# Patient Record
Sex: MALE | Race: BLACK OR AFRICAN AMERICAN | Employment: FULL TIME | URBAN - METROPOLITAN AREA
[De-identification: names, ages, dates, MRNs, and addresses within clinical notes are randomized per-mention and may not be internally consistent; named-entity substitution may affect disease eponyms.]

---

## 2017-12-13 ENCOUNTER — OFFICE VISIT (OUTPATIENT)
Dept: FAMILY MEDICINE | Facility: CLINIC | Age: 37
End: 2017-12-13
Payer: COMMERCIAL

## 2017-12-13 VITALS
SYSTOLIC BLOOD PRESSURE: 124 MMHG | BODY MASS INDEX: 24.03 KG/M2 | HEART RATE: 73 BPM | WEIGHT: 177.4 LBS | TEMPERATURE: 98.2 F | OXYGEN SATURATION: 99 % | DIASTOLIC BLOOD PRESSURE: 81 MMHG | HEIGHT: 72 IN

## 2017-12-13 DIAGNOSIS — Z01.818 PREOP GENERAL PHYSICAL EXAM: Primary | ICD-10-CM

## 2017-12-13 RX ORDER — FLUTICASONE PROPIONATE 50 MCG
SPRAY, SUSPENSION (ML) NASAL
COMMUNITY
End: 2018-02-16

## 2017-12-13 RX ORDER — MULTIVITAMIN WITH IRON
TABLET ORAL
COMMUNITY

## 2017-12-13 NOTE — MR AVS SNAPSHOT
After Visit Summary   12/13/2017    Mahdi BRIA Cesar    MRN: [id number]           Patient Information     Date Of Birth          1980        Visit Information        Provider Department      12/13/2017 4:10 PM Jason Schreiber,  [facility]        Today's Diagnoses     Preop general physical exam    -  1      Care Instructions      Presurgery Checklist  You are scheduled to have surgery. The healthcare staff will try to make your stay comfortable. Use the guidelines below to remind yourself what to do before surgery. Be sure to follow any specific pre-op instructions from your surgeon or nurse.   Preparing for Surgery  Ask your surgeon if you ll need a blood transfusion during surgery and if so, how to prepare for it. In some cases, you can donate blood before surgery. If needed, this blood can be given back (transfused) to you during or after surgery.  If you are having abdominal surgery, ask what you need to do to clear your bowel.  Tell your surgeon if you have allergies to any medications or foods.  Arrange for an adult family member or friend to drive you home after surgery. If possible, have someone ready to help you at home as you recover.  Call the surgeon if you get a cold, fever, sore throat, diarrhea, or other health problem just before surgery. Your surgeon can decide whether or not to postpone the surgery.  Medications  Tell your surgeon about all medications you take, including prescription and over-the-counter products such as herbal remedies and vitamins. Ask if you should continue taking them.  If you take ibuprofen, naproxen, or  blood thinners  such as aspirin, clopidogrel (Plavix), or warfarin (Coumadin), ask your surgeon whether you should stop taking them and how long before surgery you should stop.  You may be told to take antibiotics just before surgery to prevent infection. If so, follow instructions carefully on how to take them.  If you are told to take medications  called anticoagulants to prevent blood clots after surgery, be sure to follow the instructions on how to take them.  Stop Smoking  If you smoke, healing may take longer. So at least 2 week(s) before surgery, stop smoking.  Bathing or Showering Before Surgery  If instructed, wash with antibacterial soap. Afterward, do not use lotions or powders.  If you are having surgery on the head, you may be asked to shampoo with antibacterial soap. Follow instructions for doing so.  Do Not Remove Hair from the Surgery Site  Do not shave hair from the incision site, unless you are given specific instructions to do so. Usually, if hair needs to be removed, it will be done at the hospital right before surgery.  Don t Eat or Drink  Your doctor will tell you when to stop eating and drinking. If you do not follow your doctor's instructions, your procedure may be postponed or rescheduled for another day.  If your surgeon tells you to continue any medications, take them with small sips of water.  You can brush your teeth and rinse your mouth, but don t swallow any water.  Day of Surgery  Do not wear makeup. Do not use perfume, deodorant, or hairspray. Remove nail polish and artificial nails.  Leave jewelry (including rings), watches, and other valuables at home.  Be sure to bring health insurance cards or forms and a photo ID.  Bring a list of your medications (include the name, dose, how often you take them, and the time last dose was taken).  Arrive on time at the hospital or surgery facility.          Follow-ups after your visit        Who to contact     Please call your clinic at [phone] to:    Ask questions about your health    Make or cancel appointments    Discuss your medicines    Learn about your test results    Speak to your doctor   If you have compliments or concerns about an experience at your clinic, or if you wish to file a complaint, please contact [facility] Patient Relations at [phone]  "or email us at [email or web address]         Additional Information About Your Visit        [employer] Information     Good[employer]! is an electronic gateway that provides easy, online access to your medical records. With GoodChime!, you can request a clinic appointment, read your test results, renew a prescription or communicate with your care team.     To sign up for GoodChime! visit the website at [email or web address]   You will be asked to enter the access code listed below, as well as some personal information. Please follow the directions to create your username and password.     Your access code is: [email or web address]  Expires: 3/13/2018  5:20 PM     Your access code will  in 90 days. If you need help or a new code, please contact your [facility] or call [phone] for assistance.        Care EveryWhere ID     This is your Care EveryWhere ID. This could be used by other organizations to access your [place] medical records  [id number]        Your Vitals Were     Pulse Temperature Height Pulse Oximetry BMI (Body Mass Index)       73 98.2  F (36.8  C) 5' 11.65\" (182 cm) 99% 24.29 kg/m2        Blood Pressure from Last 3 Encounters:   17 124/81   10/18/15 127/67   12 139/104    Weight from Last 3 Encounters:   17 177 lb 6.4 oz (80.5 kg)   10/18/15 185 lb (83.9 kg)   12 186 lb (84.4 kg)              Today, you had the following     No orders found for display       Primary Care Provider Office Phone # Fax #    Nabila Núñez [phone]       [address] [address]        Equal Access to Services     JACKSON TORRES : Zonia Lovelace, nancy miguel, dimple kaalmaedgar xie. So [facility] [phone].    ATENCIÓN: Si habla español, tiene a gray disposición servicios gratuitos de asistencia lingüística. Llame al [phone].    We comply with " applicable federal civil rights laws and [place] laws. We do not discriminate on the basis of race, color, national origin, age, disability, sex, sexual orientation, or gender identity.            Thank you!     Thank you for choosing [facility]  for your care. Our goal is always to provide you with excellent care. Hearing back from our patients is one way we can continue to improve our services. Please take a few minutes to complete the written survey that you may receive in the mail after your visit with us. Thank you!             Your Updated Medication List - Protect others around you: Learn how to safely use, store and throw away your medicines at www.disposemymeds.org.          This list is accurate as of: 12/13/17  5:20 PM.  Always use your most recent med list.                   Brand Name Dispense Instructions for use Diagnosis    ALBUTEROL SULFATE HFA IN           FISH OIL CONCENTRATE 300 MG Caps           fluticasone 50 MCG/ACT spray    FLONASE          ibuprofen 600 MG tablet    ADVIL/MOTRIN    20 tablet    Take 1 tablet (600 mg) by mouth every 8 hours as needed for pain        PSEUDOEPHEDRINE HCL ER PO

## 2017-12-13 NOTE — PROGRESS NOTES
Preceptor attestation:  Patient seen and discussed with the resident. Assessment and plan reviewed with resident and agreed upon.  Supervising physician: Rocco Feldman  [facility]

## 2017-12-13 NOTE — PROGRESS NOTES
[address] [address]  Phone: [phone]  Fax: [phone]    12/13/2017    Adult PRE-OP Evaluation:    Mahdi BRIA Cesar, 1980 presents for pre-operative evaluation and assessment as requested by Dr. Williamson, prior to undergoing surgery/procedure for treatment of  Chronic sinusitis .    Proposed procedure: Septoplasty/sinus surgery    Date of Surgery/ Procedure: 12/14/17  Hospital/Surgical Facility: Day surgery center [facility]     Primary Physician: Nabila Núñez  Type of Anesthesia Anticipated: General  History of anesthesia complications: NONE  History of  abnormal bleeding: NONE   History of blood transfusions: NO  Patient has a Health Care Directive or Living Will:  NO    Preoperative Questions   1. NO - Do you have a history of heart attack, stroke, stent, bypass or surgery on an artery in the head, neck, heart or legs?  2. YES - Do you ever have any pain or discomfort in your chest? Asthma  3. NO - Have you ever had a severe pain across the front of your chest lasting for half an hour or more?  4. NO - Do you have a history of Congestive Heart Failure?  5. NO - Are you troubled by shortness of breath when: walking on the level/ up a slight hill/ at night?  6. YES - Does your chest ever sound wheezy or whistling? Asthma  7. NO - Do you currently have a cold, bronchitis or other respiratory infection?  8. YES - Have you had a cold, bronchitis or other respiratory infection within the last 2 weeks? Sinus infections  9. NO - Do you usually have a cough?  10. NO - Do you sometimes get pains in the calves of your legs when you walk?  11. NO - Do you or anyone in your family have previous history of blood clots?  12. NO - Do you or does anyone in your family have a serious bleeding problem such as prolonged bleeding following surgeries or cuts?  13. NO - Have you ever had problems with anemia or been told to take iron pills?  14. NO - Have you had any abnormal blood loss such as  black, tarry or bloody stools, or abnormal vaginal bleeding?  15. NO - Have you ever had a blood transfusion?  16. NO - Have you or any of your relatives ever had problems with anesthesia?  17. YES - Do you have sleep apnea, excessive snoring or daytime drowsiness? But does not use CPAP  18. NO - Do you have any prosthetic heart valves?  19. NO - Do you have prosthetic joints?  20. NO - Is there any chance that you may be pregnant?    PMH:   Past Medical History:   Diagnosis Date     Chronic sinusitis      Latent tuberculosis 2001    completed 9 months INH     Uncomplicated asthma          PSH:   Past Surgical History:   Procedure Laterality Date     SHOULDER SURGERY  2007     TONSILLECTOMY       SH:   Social History     Social History     Marital status:      Spouse name: N/A     Number of children: N/A     Years of education: N/A     Occupational History     Not on file.     Social History Main Topics     Smoking status: Former Smoker     Quit date: 2001     Smokeless tobacco: Never Used     Alcohol use No     Drug use: No     Sexual activity: Yes     Partners: Female     Other Topics Concern     Not on file     Social History Narrative     FH:       Family History   Problem Relation Age of Onset     Dementia Mother      CANCER No family hx of          Current Outpatient Prescriptions on File Prior to Visit:  ibuprofen (ADVIL,MOTRIN) 600 MG tablet Take 1 tablet (600 mg) by mouth every 8 hours as needed for pain     No current facility-administered medications on file prior to visit.     OTC products: benadryl and ibuprofen    Allergies   Allergen Reactions     Pollen Extract Itching     Dogs,cats     Latex Allergy: NO    Social History     Social History     Marital status:      Spouse name: N/A     Number of children: N/A     Years of education: N/A     Social History Main Topics     Smoking status: Not on file     Smokeless tobacco: Not on file     Alcohol use Not on file     Drug use: Not on file  "    Sexual activity: Not on file     Other Topics Concern     Not on file     Social History Narrative     No narrative on file       REVIEW OF SYSTEMS:   Positive sinus pain.   Constitutional, cardiovascular, pulmonary, GI, , musculoskeletal, neuro, skin, endocrine and psych systems are negative, except as otherwise noted.      EXAM:     Patient Vitals for the past 24 hrs:   BP Temp Pulse SpO2 Height Weight   12/13/17 1629 124/81 98.2  F (36.8  C) 73 99 % 5' 11.65\" (182 cm) 177 lb 6.4 oz (80.5 kg)     Body mass index is 24.29 kg/(m^2).  GENERAL: healthy, alert and no distress  EYES: Eyes grossly normal to inspection, extraocular movements - intact, and PERRL  HENT: ear canals- normal; TMs- normal; Nose- normal; Mouth- no ulcers, no lesions  NECK: no tenderness, no adenopathy, no asymmetry, no masses, no stiffness; thyroid- normal to palpation  RESP: lungs clear to auscultation - no rales, no rhonchi, no wheezes  CV: regular rates and rhythm, normal S1 S2, no S3 or S4, no click or rub -2/6 holosystolic murmur previously noted  ABDOMEN: soft, no tenderness, no  hepatosplenomegaly, no masses, normal bowel sounds  MS: extremities- no gross deformities noted, no edema  SKIN: no suspicious lesions, no rashes  NEURO: strength and tone- normal, sensory exam- grossly normal, mentation- intact, speech- normal, reflexes- symmetric  BACK: no CVA tenderness, no paralumbar tenderness  PSYCH: Alert and oriented times 3; speech- coherent , normal rate and volume; able to articulate logical thoughts  LYMPHATICS: ant. cervical- normal, post. cervical- normal    DIAGNOSTICS:      No labs or EKG required    RISK ASSESSMENT:     Cardiovascular Risk:  -Patient is able to participate in strenuous activities without chest pain.  -The patient does not have chest pain with exertion.  -Patient does not have a history of congestive heart failure.    -The patient does not have a history of stroke and does not have a history of valvular " disease.  -Echocardiogram from 2010 reviewed.    Pulmonary Risk:  -In terms of risk factors for pulmonary complication, the patient has asthma    Perioperative Complications:  -The patient does not have a history of bleeding or clotting problems in the past.    -The patient has not had complications from surgeries.    -The patient does not have a family history of any anesthesia or surgical complications.      IMPRESSION:   Reason for surgery/procedure: Chronic sinusitis    The proposed surgical procedure is considered INTERMEDIATE risk.    For above listed surgery and anesthesia:   Patient is at low risk for surgery/procedure and perioperative/procedure complications.    RECOMMENDATIONS:     Labs:  None needed.  Echocardiogram from 2010 reviewed.    Fasting:  NPO for 12 hours prior to surgery    Preop Plan:  --Approval given to proceed with proposed procedure, without further diagnostic evaluation    Medications:  Patient should hold their regular medications the morning of surgery unless otherwise instructed.    Hold ibuprofen for 5 days prior.      Jason Schreiber, DO    Please contact our office if there are any further questions or information required about this patient.

## 2018-02-16 ENCOUNTER — OFFICE VISIT (OUTPATIENT)
Dept: FAMILY MEDICINE | Facility: CLINIC | Age: 38
End: 2018-02-16
Payer: COMMERCIAL

## 2018-02-16 VITALS
OXYGEN SATURATION: 98 % | SYSTOLIC BLOOD PRESSURE: 144 MMHG | WEIGHT: 180.8 LBS | TEMPERATURE: 97.9 F | BODY MASS INDEX: 24.49 KG/M2 | HEIGHT: 72 IN | HEART RATE: 84 BPM | DIASTOLIC BLOOD PRESSURE: 84 MMHG

## 2018-02-16 DIAGNOSIS — K21.9 GASTROESOPHAGEAL REFLUX DISEASE, ESOPHAGITIS PRESENCE NOT SPECIFIED: Primary | ICD-10-CM

## 2018-02-16 DIAGNOSIS — J32.9 RHINOSINUSITIS: ICD-10-CM

## 2018-02-16 RX ORDER — FLUTICASONE PROPIONATE 50 MCG
1 SPRAY, SUSPENSION (ML) NASAL DAILY
Qty: 1 BOTTLE | Refills: 1 | Status: SHIPPED | OUTPATIENT
Start: 2018-02-16

## 2018-02-16 RX ORDER — ALPHA-D-GALACTOSIDASE 150 UNIT
2 TABLET ORAL DAILY PRN
Qty: 84 TABLET | Refills: 1 | Status: SHIPPED | OUTPATIENT
Start: 2018-02-16

## 2018-02-16 RX ORDER — FAMOTIDINE 20 MG/1
20 TABLET, FILM COATED ORAL 2 TIMES DAILY
Qty: 60 TABLET | Refills: 1 | Status: SHIPPED | OUTPATIENT
Start: 2018-02-16 | End: 2018-05-10

## 2018-02-16 NOTE — PATIENT INSTRUCTIONS
- Take the Beano ONLY if you have gas with meals  - Take the famotidine 2 x a day with meals  - Take the flonase daily  - If continued pain, no relief in 2-3 weeks, make appointment with me, if pain worsens, fevers, chills, vomiting or diarrhea or feel unwell, see me sooner

## 2018-02-16 NOTE — PROGRESS NOTES
Preceptor attestation:  Patient seen and discussed with the resident. Assessment and plan reviewed with resident and agreed upon.  Supervising physician: Stan Sierra  [facility]

## 2018-02-16 NOTE — MR AVS SNAPSHOT
After Visit Summary   2018    Mahdi BRIA Cesar    MRN: [id number]           Patient Information     Date Of Birth          1980        Visit Information        Provider Department      2018 4:30 PM Gt Banda DO [facility]        Today's Diagnoses     Gastroesophageal reflux disease, esophagitis presence not specified    -  1    Rhinosinusitis          Care Instructions    - Take the Beano ONLY if you have gas with meals  - Take the famotidine 2 x a day with meals  - Take the flonase daily  - If continued pain, no relief in 2-3 weeks, make appointment with me, if pain worsens, fevers, chills, vomiting or diarrhea or feel unwell, see me sooner          Follow-ups after your visit        Who to contact     Please call your clinic at [phone] to:    Ask questions about your health    Make or cancel appointments    Discuss your medicines    Learn about your test results    Speak to your doctor            Additional Information About Your Visit        MyChart Information     [employer] is an electronic gateway that provides easy, online access to your medical records. With [employer], you can request a clinic appointment, read your test results, renew a prescription or communicate with your care team.     To sign up for [employer] visit the website at [email or web address]   You will be asked to enter the access code listed below, as well as some personal information. Please follow the directions to create your username and password.     Your access code is: [email or web address]  Expires: 3/13/2018  5:20 PM     Your access code will  in 90 days. If you need help or a new code, please contact your [facility] or call [phone] for assistance.        Care EveryWhere ID     This is your Care EveryWhere ID. This could be used by other organizations to access your [facility] medical records  [id number]        Your Vitals Were     Pulse Temperature Height Pulse  "Oximetry BMI (Body Mass Index)       84 97.9  F (36.6  C) (Oral) 5' 11.85\" (182.5 cm) 98% 24.62 kg/m2        Blood Pressure from Last 3 Encounters:   02/16/18 144/84   12/13/17 124/81   10/18/15 127/67    Weight from Last 3 Encounters:   02/16/18 180 lb 12.8 oz (82 kg)   12/13/17 177 lb 6.4 oz (80.5 kg)   10/18/15 185 lb (83.9 kg)              Today, you had the following     No orders found for display         Today's Medication Changes          These changes are accurate as of 2/16/18  5:04 PM.  If you have any questions, ask your nurse or doctor.               Start taking these medicines.        Dose/Directions    BEANO TO GO Tabs   Used for:  Gastroesophageal reflux disease, esophagitis presence not specified   Started by:  Gt Banda DO        Dose:  2 tablet   Take 2 tablets by mouth daily as needed   Quantity:  84 tablet   Refills:  1       famotidine 20 MG tablet   Commonly known as:  PEPCID   Used for:  Gastroesophageal reflux disease, esophagitis presence not specified   Started by:  Gt Banda DO        Dose:  20 mg   Take 1 tablet (20 mg) by mouth 2 times daily   Quantity:  60 tablet   Refills:  1         These medicines have changed or have updated prescriptions.        Dose/Directions    fluticasone 50 MCG/ACT spray   Commonly known as:  FLONASE   This may have changed:    - how much to take  - how to take this  - when to take this   Used for:  Rhinosinusitis   Changed by:  Gt Banda DO        Dose:  1 spray   Spray 1 spray into both nostrils daily   Quantity:  1 Bottle   Refills:  1            Where to get your medicines      These medications were sent to [facility] - [place], [place] - [address]  [address], [place] [address]     Phone:  [phone]     BEANO TO GO Tabs    famotidine 20 MG tablet    fluticasone 50 MCG/ACT spray                Primary Care Provider Office Phone # Fax #    Nabila Núñez [phone]       [facility] [address] [address]        Equal Access to Services     JACKSON TORRES : Hadii aad ku hadestrellaraymon [place], wataniyavinh sullivancarlosha, dimple jgsidravinh esquivel, edgar camarasaisaleem kumar. So [facility] [phone].    ATENCIÓN: Si habla español, tiene a gray disposición servicios gratuitos de asistencia lingüística. Llame al [phone].    We comply with applicable federal civil rights laws and [place] laws. We do not discriminate on the basis of race, color, national origin, age, disability, sex, sexual orientation, or gender identity.            Thank you!     Thank you for choosing [facility]  for your care. Our goal is always to provide you with excellent care. Hearing back from our patients is one way we can continue to improve our services. Please take a few minutes to complete the written survey that you may receive in the mail after your visit with us. Thank you!             Your Updated Medication List - Protect others around you: Learn how to safely use, store and throw away your medicines at www.disposemymeds.org.          This list is accurate as of 2/16/18  5:04 PM.  Always use your most recent med list.                   Brand Name Dispense Instructions for use Diagnosis    ALBUTEROL SULFATE HFA IN           BEANO TO GO Tabs     84 tablet    Take 2 tablets by mouth daily as needed    Gastroesophageal reflux disease, esophagitis presence not specified       famotidine 20 MG tablet    PEPCID    60 tablet    Take 1 tablet (20 mg) by mouth 2 times daily    Gastroesophageal reflux disease, esophagitis presence not specified       FISH OIL CONCENTRATE 300 MG Caps           fluticasone 50 MCG/ACT spray    FLONASE    1 Bottle    Spray 1 spray into both nostrils daily    Rhinosinusitis       ibuprofen 600 MG tablet    ADVIL/MOTRIN    20 tablet    Take 1 tablet (600 mg) by mouth every 8 hours as needed for pain        PSEUDOEPHEDRINE HCL ER PO

## 2018-02-16 NOTE — PROGRESS NOTES
"  Family History   Problem Relation Age of Onset     Dementia Mother      CANCER No family hx of      Social History     Social History     Marital status:      Spouse name: N/A     Number of children: N/A     Years of education: N/A     Social History Main Topics     Smoking status: Former Smoker     Quit date: 2001     Smokeless tobacco: Never Used     Alcohol use No     Drug use: No     Sexual activity: Yes     Partners: Female     Other Topics Concern     Not on file     Social History Narrative       There are no exam notes on file for this visit.  Chief Complaint   Patient presents with     Ulcer      Pt feels a burning sensation in stomach. Passing gas with stronger odor. Lips feel hot when breathing. For the past 2 weeks     Pain     Pt has pain when he touches his stomach for the past 2 weeks.      Other     Pt has allergies, feels like pressure around the face.      Blood pressure 144/84, pulse 84, temperature 97.9  F (36.6  C), temperature source Oral, height 5' 11.85\" (182.5 cm), weight 180 lb 12.8 oz (82 kg), SpO2 98 %.    S:  Patient has pain, grumbling on abdomin. Pain is on epigastric area. Exacerbated by spicy food, coffee. If burps or pass gas, feels better. Has been present for a long time. But recent exacerbated for 2-3 weeks. Has taken TUMs with minimal relief. Has helped a lot in the past but not help this time. Admits to increasing fruits in last few weeks. Patient had endscopy done in 2013 and showed small non-bleeding erosion in gastric fundus and antrum attributed to NSAID use. Patient reports he no longer uses NSAID and no alcohol drinking    Patient has pressure and pain on face. Continued itchy, pressure, runny eyes. Also headache. Recent surgery on Dec 14, 2017 for septoplasty/ Sinus surgery for similar symptoms. After surgery surgery, returned 2 weeks ago.    No fever, chills, no trouble breathing, chest pain. No new rash    O:  /84  Pulse 84  Temp 97.9  F (36.6  C) " "(Oral)  Ht 5' 11.85\" (182.5 cm)  Wt 180 lb 12.8 oz (82 kg)  SpO2 98%  BMI 24.62 kg/m2  General: On Chair, no acute distress  HEENT: No conjunctivitis, EOM grossly intact, oral mucosa pink and moist, no cervical adenopathy palpated, no uvula noted. Mild erythema with postnasal drip noted on oral mucosa. Mildly tender on maxillary and frontal sinus  Lungs: CTA all fields, no wheeze, no crackles, no respiratory distress  Abd: Soft,not distended, no guarding, no rebound, normoactive bowel sounds, No massess palpable. Non-tender when pressed with stethscope but reports tenderness throughout using hands. Main area of tenderness is epigastric area   Extremites: No edema, no lesions noted,   Skin: No lesions, no edema, excorations, or rashes noted    A/P:  1. Gastroesophageal reflux disease, esophagitis presence not specified  From past history and chart review, patient had extensive abdominal work up at previous clinic. Significant for negative H. Pylori, negative LFTs, Endoscopy significant for small ulceration attributed to NSAID use but patient reports that he no longer uses this. Was previously on PPI but d/c as he \"did not want to use medication\". Reports stopped using this for a while but rebound acid reflux in differential. abd pain also attributed to gas pain due to increase in fruits. Will tx with famotidine and Beano (aware that insurance may not cover this). Discussed how Famotidine may take up to 2-3 weeks for full effect. No imaging to be done at this time given negative H pylori, previous endoscopy in 2013 but consider for future if continue to have pain.  - famotidine (PEPCID) 20 MG tablet; Take 1 tablet (20 mg) by mouth 2 times daily  Dispense: 60 tablet; Refill: 1  - Alpha-D-Galactosidase (BEANO TO GO) TABS; Take 2 tablets by mouth daily as needed  Dispense: 84 tablet; Refill: 1  - Switch to PPI if no effect in 2-3 weeks, can consider labs and imaging if pain increases    2. Rhinosinusitis  Had surgery " <3 months ago for similar symptoms, can be possibly be due to allergies. No fevers, no inflammation or infection suspected at this time so will not treat with ABx. Tx symptomatically with flonase. If increasing pain, discussed RTC, can re-refer back to ENT at that time if no improvement or worsening pain  - fluticasone (FLONASE) 50 MCG/ACT spray; Spray 1 spray into both nostrils daily  Dispense: 1 Bottle; Refill: 1      Gt Banda  Family Medicine Resident PGY3        Patient Instructions   - Take the Beano ONLY if you have gas with meals  - Take the famotidine 2 x a day with meals  - Take the flonase daily  - If continued pain, no relief in 2-3 weeks, make appointment with me, if pain worsens, fevers, chills, vomiting or diarrhea or feel unwell, see me sooner      Discussed with Dr. Sierra who agrees with the plan

## 2018-05-10 ENCOUNTER — OFFICE VISIT (OUTPATIENT)
Dept: FAMILY MEDICINE | Facility: CLINIC | Age: 38
End: 2018-05-10
Payer: COMMERCIAL

## 2018-05-10 VITALS
RESPIRATION RATE: 16 BRPM | SYSTOLIC BLOOD PRESSURE: 137 MMHG | BODY MASS INDEX: 24.76 KG/M2 | OXYGEN SATURATION: 98 % | HEIGHT: 72 IN | DIASTOLIC BLOOD PRESSURE: 88 MMHG | WEIGHT: 182.8 LBS | TEMPERATURE: 97.7 F | HEART RATE: 71 BPM

## 2018-05-10 DIAGNOSIS — K21.9 GASTROESOPHAGEAL REFLUX DISEASE WITHOUT ESOPHAGITIS: Primary | ICD-10-CM

## 2018-05-10 RX ORDER — CALCIUM CARBONATE 500 MG/1
1 TABLET, CHEWABLE ORAL 3 TIMES DAILY PRN
Qty: 200 TABLET | Refills: 0 | Status: SHIPPED | OUTPATIENT
Start: 2018-05-10 | End: 2018-11-02

## 2018-05-10 NOTE — PROGRESS NOTES
Preceptor Attestation:   Patient seen, evaluated and discussed with the resident. I have verified the content of the note, which accurately reflects my assessment of the patient and the plan of care.   Supervising Physician:  Stan Sierra MD

## 2018-05-10 NOTE — MR AVS SNAPSHOT
"              After Visit Summary   5/10/2018    Mahdi BRIA Cesar    MRN: [id number]           Patient Information     Date Of Birth          1980        Visit Information        Provider Department      5/10/2018 3:30 PM Jason Schreiber,  [facility]        Today's Diagnoses     Gastroesophageal reflux disease without esophagitis    -  1       Follow-ups after your visit        Follow-up notes from your care team     Return in about 1 week (around 2018) for travel visit.      Who to contact     Please call your clinic at [phone] to:    Ask questions about your health    Make or cancel appointments    Discuss your medicines    Learn about your test results    Speak to your doctor            Additional Information About Your Visit        MyChart Information     [employer] is an electronic gateway that provides easy, online access to your medical records. With [employer], you can request a clinic appointment, read your test results, renew a prescription or communicate with your care team.     To sign up for [employer] visit the website at [email or web address]   You will be asked to enter the access code listed below, as well as some personal information. Please follow the directions to create your username and password.     Your access code is: [email or web address]  Expires: 2018  4:13 PM     Your access code will  in 90 days. If you need help or a new code, please contact your [facility] or call [phone] for assistance.        Care EveryWhere ID     This is your Care EveryWhere ID. This could be used by other organizations to access your [facility] medical records  [id number]        Your Vitals Were     Pulse Temperature Respirations Height Pulse Oximetry BMI (Body Mass Index)    71 97.7  F (36.5  C) (Oral) 16 5' 11.85\" (182.5 cm) 98% 24.9 kg/m2       Blood Pressure from Last 3 Encounters:   05/10/18 137/88   18 144/84   17 124/81    Weight from Last " 3 Encounters:   05/10/18 182 lb 12.8 oz (82.9 kg)   02/16/18 180 lb 12.8 oz (82 kg)   12/13/17 177 lb 6.4 oz (80.5 kg)              Today, you had the following     No orders found for display         Today's Medication Changes          These changes are accurate as of 5/10/18  4:13 PM.  If you have any questions, ask your nurse or doctor.               Start taking these medicines.        Dose/Directions    calcium carbonate 500 MG chewable tablet   Commonly known as:  TUMS   Used for:  Gastroesophageal reflux disease without esophagitis   Started by:  Jason Schreiber,         Dose:  1 chew tab   Take 1 tablet (500 mg) by mouth 3 times daily as needed for heartburn   Quantity:  200 tablet   Refills:  0       omeprazole 20 MG CR capsule   Commonly known as:  priLOSEC   Used for:  Gastroesophageal reflux disease without esophagitis   Started by:  Jason Schreiber,         Dose:  20 mg   Take 1 capsule (20 mg) by mouth daily   Quantity:  120 capsule   Refills:  1         Stop taking these medicines if you haven't already. Please contact your care team if you have questions.     famotidine 20 MG tablet   Commonly known as:  PEPCID   Stopped by:  Jason Schreiber DO                Where to get your medicines      These medications were sent to [facility] - [place], [place] - [address], [place] [address]     Phone:  [phone]     calcium carbonate 500 MG chewable tablet    omeprazole 20 MG CR capsule                Primary Care Provider Office Phone # Fax #    Nabila Núñez [phone]       [address] [address]        Equal Access to Services     [facility]: Hadii guanaco fay hadasho Soomaali, waaxda luqadaha, qaybta kaalmada adeegyada, edgar kumar. So [facility] [phone].    ATENCIÓN: Si habla español, tiene a gray disposición servicios gratuitos de asistencia lingüística. Llame al  [phone].    We comply with applicable federal civil rights laws and [place] laws. We do not discriminate on the basis of race, color, national origin, age, disability, sex, sexual orientation, or gender identity.            Thank you!     Thank you for choosing [facility]  for your care. Our goal is always to provide you with excellent care. Hearing back from our patients is one way we can continue to improve our services. Please take a few minutes to complete the written survey that you may receive in the mail after your visit with us. Thank you!             Your Updated Medication List - Protect others around you: Learn how to safely use, store and throw away your medicines at www.disposemymeds.org.          This list is accurate as of 5/10/18  4:13 PM.  Always use your most recent med list.                   Brand Name Dispense Instructions for use Diagnosis    ALBUTEROL SULFATE HFA IN           BEANO TO GO Tabs     84 tablet    Take 2 tablets by mouth daily as needed    Gastroesophageal reflux disease, esophagitis presence not specified       calcium carbonate 500 MG chewable tablet    TUMS    200 tablet    Take 1 tablet (500 mg) by mouth 3 times daily as needed for heartburn    Gastroesophageal reflux disease without esophagitis       FISH OIL CONCENTRATE 300 MG Caps           fluticasone 50 MCG/ACT spray    FLONASE    1 Bottle    Spray 1 spray into both nostrils daily    Rhinosinusitis       omeprazole 20 MG CR capsule    priLOSEC    120 capsule    Take 1 capsule (20 mg) by mouth daily    Gastroesophageal reflux disease without esophagitis

## 2018-05-10 NOTE — PROGRESS NOTES
ASSESSMENT AND PLAN     This  37 year old male presents for recheck of his epigastric pain.    1. Gastroesophageal reflux disease without esophagitis  Patient with previous extensive workup.  EGD in 2013 revealed nonbleeding erosive gastropathy and normal duodenum.  He has had negative H pylori ×3.  He does not have any red flag symptoms.  He had some relief with Pepcid.  We will switch to omeprazole today.  - omeprazole (PRILOSEC) 20 MG CR capsule; Take 1 capsule (20 mg) by mouth daily  Dispense: 120 capsule; Refill: 1  - calcium carbonate (TUMS) 500 MG chewable tablet; Take 1 tablet (500 mg) by mouth 3 times daily as needed for heartburn  Dispense: 200 tablet; Refill: 0     I ended our visit today by discussing the patient's diagnoses and recommended treatment. Please refer to today's diagnoses and orders for further details. I briefly discussed the pathophysiology of these conditions and outlined their expected course. I discussed the warning symptoms and signs that indicate an atypical course that would need urgent or emergent care. I also discussed self care strategies for symptom relief. Patient voiced understanding of plan of care and was in full agreement to proceed as discussed.    RTC in 1 week for travel visit or sooner if develops new or worsening symptoms.  Patient discussed and seen with Stan Sierra MD, attending physician who agrees with the plan.     Jason Schreiber DO PGY-3  [facility]   Pager: [phone]         SUBJECTIVE   Mahdi BRIA Cesar is a 37 year old male with past medical history of stomach pain and GERD.    Patient was seen in February 2018 for symptoms of epigastric pain and bloating.  He was started on Zantac patient reports improvement of his symptoms but remaining epigastric pain with eating.  He endorses worsening of symptoms with spicy foods.  He also endorses bloating but denies any vomiting, diarrhea, constipation, melena,  "hematochezia, or hematemesis.  Denies any fevers or chills.    He also reports that he will be traveling to [facility] from June to September to visit his ailing mother.  He would like a 4 month supply of his medications.  He is a scheduled travel visit set up in 1 week.    PMH, Medications and Allergies were reviewed and updated as needed.        Family and Social Hx     PMH:   Past Medical History:   Diagnosis Date     Chronic sinusitis      Latent tuberculosis 2001    completed 9 months INH     Uncomplicated asthma          PSH:   Past Surgical History:   Procedure Laterality Date     SHOULDER SURGERY  2007     TONSILLECTOMY       SH:   Social History     Social History     Marital status:      Spouse name: N/A     Number of children: N/A     Years of education: N/A     Occupational History     Not on file.     Social History Main Topics     Smoking status: Former Smoker     Quit date: 2001     Smokeless tobacco: Never Used     Alcohol use No     Drug use: No     Sexual activity: Yes     Partners: Female     Other Topics Concern     Not on file     Social History Narrative     FH: non-contributory       Family History   Problem Relation Age of Onset     Dementia Mother      CANCER No family hx of          OBJECTIVE     Vitals:    05/10/18 1539   BP: 137/88   Pulse: 71   Resp: 16   Temp: 97.7  F (36.5  C)   TempSrc: Oral   SpO2: 98%   Weight: 182 lb 12.8 oz (82.9 kg)   Height: 5' 11.85\" (182.5 cm)     Body mass index is 24.9 kg/(m^2).  Gen:  Well nourished and in NAD  HEENT: PERRL. EOMI,  NP/OP pink and moist   Neck: supple, no lymphadenopathy appreciated  CV:  RRR  - no murmurs, rubs, or gallops. Good distal perfusion.  Pulm:  CTAB, no wheezes/rales/rhonchi, good air entry, normal work of breathing  ABD: Soft, mild epigastric tenderness to palpation, no masses, no rebound, BS intact throughout  Extrem: No cyanosis, edema or clubbing.   MSK: gross motor and sensation intact, gait normal, tone " normal  Skin: no suspicious lesions or rashes    Dragon Dictation software was used for this note.

## 2018-06-06 ENCOUNTER — OFFICE VISIT (OUTPATIENT)
Dept: FAMILY MEDICINE | Facility: CLINIC | Age: 38
End: 2018-06-06
Payer: COMMERCIAL

## 2018-06-06 VITALS
DIASTOLIC BLOOD PRESSURE: 90 MMHG | BODY MASS INDEX: 24.33 KG/M2 | OXYGEN SATURATION: 97 % | HEART RATE: 75 BPM | TEMPERATURE: 97.9 F | HEIGHT: 72 IN | SYSTOLIC BLOOD PRESSURE: 136 MMHG | WEIGHT: 179.6 LBS

## 2018-06-06 DIAGNOSIS — R35.0 URINARY FREQUENCY: ICD-10-CM

## 2018-06-06 DIAGNOSIS — Z00.00 ROUTINE GENERAL MEDICAL EXAMINATION AT A HEALTH CARE FACILITY: Primary | ICD-10-CM

## 2018-06-06 DIAGNOSIS — K59.00 CONSTIPATION, UNSPECIFIED CONSTIPATION TYPE: ICD-10-CM

## 2018-06-06 LAB
ALBUMIN SERPL BCP-MCNC: 4.1 G/DL (ref 3.5–5)
ALP SERPL-CCNC: 73 U/L (ref 45–120)
ALT SERPL W/O P-5'-P-CCNC: 27 U/L (ref 0–45)
ANION GAP SERPL CALCULATED.3IONS-SCNC: 10 MMOL/L (ref 5–18)
AST SERPL-CCNC: 28 U/L (ref 0–40)
BILIRUB SERPL-MCNC: 1.4 MG/DL (ref 0–1)
BILIRUBIN UR: NEGATIVE
BLOOD UR: NEGATIVE
BUN SERPL-MCNC: 12 MG/DL (ref 8–22)
CALCIUM SERPL-MCNC: 9.6 MG/DL (ref 8.5–10.5)
CHLORIDE SERPL-SCNC: 106 MMOL/L (ref 98–107)
CHOLEST SERPL-MCNC: 244 MG/DL
CO2 SERPL-SCNC: 27 MMOL/L (ref 22–31)
CREAT SERPL-MCNC: 0.92 MG/DL (ref 0.7–1.3)
FASTING?: ABNORMAL
GLUCOSE SERPL-MCNC: 97 MG/DL (ref 70–125)
GLUCOSE URINE: NEGATIVE
HBA1C MFR BLD: 5.5 % (ref 4.1–5.7)
HDLC SERPL-MCNC: 49 MG/DL
HIV 1+2 AB+HIV1 P24 AG SERPL QL IA: NEGATIVE
KETONES UR QL: NEGATIVE
LDLC SERPL CALC-MCNC: 164 MG/DL
LEUKOCYTE ESTERASE UR: NEGATIVE
MAGNESIUM SERPL-MCNC: 2.2 MG/DL (ref 1.8–2.6)
NITRITE UR QL STRIP: NEGATIVE
PH UR STRIP: 6.5 [PH] (ref 5–7)
POTASSIUM SERPL-SCNC: 3.5 MMOL/L (ref 3.5–5)
PROT SERPL-MCNC: 7.5 G/DL (ref 6–8)
PROTEIN UR: NEGATIVE
SODIUM SERPL-SCNC: 143 MMOL/L (ref 136–145)
SP GR UR STRIP: 1.02
TRIGL SERPL-MCNC: 153 MG/DL
UROBILINOGEN UR STRIP-ACNC: NORMAL

## 2018-06-06 RX ORDER — POLYETHYLENE GLYCOL 3350 17 G/17G
1 POWDER, FOR SOLUTION ORAL DAILY
Qty: 510 G | Refills: 1 | Status: SHIPPED | OUTPATIENT
Start: 2018-06-06 | End: 2018-11-06

## 2018-06-06 NOTE — PROGRESS NOTES
Male Physical Note      Concerns today:   Patient reports over 3 weeks of urinary frequency, incomplete emptying, abdominal fullness, and dysuria.  He notes that symptoms have been worsening throughout Ramadan.  He is also noticed recently leg discomfort but denies leg pain.  No trauma.  He says symptoms improve when he elevates his legs.  He has not noticed any leg swelling.  He denies any blood loss.  -recurrent sinusitis. Follows with ENT, allergy, and pulmonology    ROS:                      CONSTITUTIONAL: no fatigue, no unexpected change in weight  SKIN: no worrisome rashes, no worrisome moles, no worrisome lesions  EYES: no acute vision problems or changes  ENT: no ear problems, no mouth problems, no throat problems  RESP: no significant cough, no shortness of breath  CV: Palpitations, no chest pain, no new or worsening peripheral edema  GI: no nausea, no vomiting, no constipation, no diarrhea   male: frequency, incomplete emptying, burning  MUSCULOSKELETAL: leg discomfort  NEURO: no weakness, no dizziness, no syncope, no headaches    Past Medical History:   Diagnosis Date     Chronic sinusitis      Latent tuberculosis 2001    completed 9 months INH     Uncomplicated asthma         Family History   Problem Relation Age of Onset     Dementia Mother      CANCER No family hx of      Reviewed no other significant FH           Family History and past Medical History reviewed and unchanged/updated.    Social History   Substance Use Topics     Smoking status: Former Smoker     Quit date: 2001     Smokeless tobacco: Never Used     Alcohol use No       Children ? yes 3 children    Has anyone hurt you physically, for example by pushing, hitting, slapping or kicking you or forcing you to have sex? Denies  Do you feel threatened or controlled by a partner, ex-partner or anyone in your life? Denies    RISK BEHAVIORS AND HEALTHY HABITS:  Tobacco Use/Smoking: None  Illicit Drug Use: None  ETOH: None  Do you use  "alcohol? No  Sexually Active: Yes  Diet (5-7 servings of fruits/veg daily): No   Exercise (30 min accumulated most days):Yes  Dental Care: Yes   Seat Belt Use: Yes     Cholesterol Level (>46 yo or at risk):  Recommended and patient accepted testing. and HIV screening:  Recommended and patient accepted testing.          Immunization History   Administered Date(s) Administered     FLU 6-35 months 02/04/2010     Hep B, Peds or Adolescent 06/15/2001     HepA-Adult 10/19/2011, 05/10/2016     HepA-ped 2 Dose 10/19/2011     HepB-Adult 06/15/2001, 11/27/2006, 01/10/2007, 06/11/2007     Influenza (H1N1) 01/09/2010     Influenza (IIV3) PF 11/19/2005, 01/10/2007, 10/17/2007, 02/16/2009, 02/04/2010, 10/18/2011, 10/22/2012     Influenza Vaccine IM 3yrs+ 4 Valent IIV4 10/31/2016     MMR 06/01/2001, 06/15/2001     Meningococcal (Menactra ) 05/10/2016     Meningococcal (Menveo ) 05/10/2016     Poliovirus, inactivated (IPV) 10/19/2011     TD (ADULT, 7+) 09/26/2000, 06/15/2001     Td (Adult), Adsorbed 09/26/2000, 06/15/2001     Tdap (Adult) Unspecified Formulation 10/18/2011     Typhoid IM 10/19/2011, 05/10/2016     Varicella 06/15/2001     Yellow Fever 05/10/2016    Reviewed Immunization Record Today    EXAMINATION:  /88 (BP Location: Left arm, Patient Position: Sitting, Cuff Size: Adult Regular)  Pulse 81  Temp 97.9  F (36.6  C) (Oral)  Ht 5' 11.85\" (182.5 cm)  Wt 179 lb 9.6 oz (81.5 kg)  SpO2 97%  BMI 24.46 kg/m2  GENERAL: healthy, alert and no distress  EYES: Eyes grossly normal to inspection, extraocular movements - intact, and PERRL  HENT: ear canals- normal; TMs- normal; Nose- normal; Mouth- no ulcers, no lesions  NECK: no tenderness, no adenopathy, no asymmetry, no masses, no stiffness; thyroid- normal to palpation  RESP: lungs clear to auscultation - no rales, no rhonchi, no wheezes  BREAST: no masses, no tenderness, no nipple discharge, no palpable axillary masses or adenopathy  CV: regular rates and rhythm, " normal S1 S2, no S3 or S4 and no murmur, no click or rub -  ABDOMEN: soft, no tenderness, no  hepatosplenomegaly, no masses, normal bowel sounds  MS: extremities- no gross deformities noted, no edema  SKIN: no suspicious lesions, no rashes  NEURO: strength and tone- normal, sensory exam- grossly normal, mentation- intact, speech- normal, reflexes- symmetric  BACK: no CVA tenderness, no paralumbar tenderness  - male: testicles- normal, no atrophy, no masses;  no inguinal hernias  PSYCH: Alert and oriented times 3; speech- coherent , normal rate and volume; able to articulate logical thoughts, able to abstract reason, no tangential thoughts, no hallucinations or delusions, affect- normal  LYMPHATICS: ant. cervical- normal, post. cervical- normal, axillary- normal, supraclavicular- normal, inguinal- normal    ASSESSMENT/PLAN:  1. Routine general medical examination at a health care facility  - HIV Ag/Ab Screen [place] ([facility])  - Vitamin D 25-Hydroxy ([facility])  - Lipid Panel ([place])    2. Urinary frequency  Differential includes UTI, diabetes, constipation, electrolyte abnormalities.  Patient has multiple complaints with normal exam.  - Hemoglobin A1c (P )  - Comprehensive Metabolic ([facility]) - Results > 1 hr  - Urinalysis, Micro If (P )  - Magnesium ([facility])    3. Constipation, unspecified constipation type  May be contributing to his abdominal fullness and urinary complaints.  Will empirically treat with MiraLAX.  Instructed patient to try his best to stay hydrated during Ramadan and to follow-up after the end of Ramadan.  - polyethylene glycol (MIRALAX) powder; Take 17 g (1 capful) by mouth daily  Dispense: 510 g; Refill: 1    RTC in 1 month for follow up or sooner if develops new or worsening symptoms.  Patient discussed and seen with Stan Sierra MD, attending physician who agrees with the plan.     Jason Schreiber DO PGY-3  [facility]    Pager: [phone]

## 2018-06-06 NOTE — MR AVS SNAPSHOT
After Visit Summary   6/6/2018    Mahdi BRIA Cesar    MRN: [id number]           Patient Information     Date Of Birth          1980        Visit Information        Provider Department      6/6/2018 4:10 PM Jason Schreiber DO [facility]        Today's Diagnoses     Routine general medical examination at a health care facility    -  1    Urinary frequency        Constipation, unspecified constipation type          Care Instructions      Preventive Health Recommendations  Male Ages 26 - 39    Yearly exam:             See your health care provider every year in order to  o   Review health changes.   o   Discuss preventive care.    o   Review your medicines if your doctor has prescribed any.    You should be tested each year for STDs (sexually transmitted diseases), if you re at risk.     After age 35, talk to your provider about cholesterol testing. If you are at risk for heart disease, have your cholesterol tested at least every 5 years.     If you are at risk for diabetes, you should have a diabetes test (fasting glucose).  Shots: Get a flu shot each year. Get a tetanus shot every 10 years.     Nutrition:    Eat at least 5 servings of fruits and vegetables daily.     Eat whole-grain bread, whole-wheat pasta and brown rice instead of white grains and rice.     Talk to your provider about Calcium and Vitamin D.     Lifestyle    Exercise for at least 150 minutes a week (30 minutes a day, 5 days a week). This will help you control your weight and prevent disease.     Limit alcohol to one drink per day.     No smoking.     Wear sunscreen to prevent skin cancer.     See your dentist every six months for an exam and cleaning.             Follow-ups after your visit        Follow-up notes from your care team     Return in about 3 weeks (around 6/27/2018).      Who to contact     Please call your clinic at [phone] to:    Ask questions about your health    Make or cancel appointments    Discuss your  "medicines    Learn about your test results    Speak to your doctor            Additional Information About Your Visit        MyChart Information     [employer] is an electronic gateway that provides easy, online access to your medical records. With [facility], you can request a clinic appointment, read your test results, renew a prescription or communicate with your care team.     To sign up for [employer] visit the website at [email or web address] ([email or web address])   You will be asked to enter the access code listed below, as well as some personal information. Please follow the directions to create your username and password.     Your access code is: [email or web address]  Expires: 2018  4:13 PM     Your access code will  in 90 days. If you need help or a new code, please contact your [facility] or call [phone] for assistance.        Care EveryWhere ID     This is your Care EveryWhere ID. This could be used by other organizations to access your [facility] medical records  [id number]        Your Vitals Were     Pulse Temperature Height Pulse Oximetry BMI (Body Mass Index)       75 97.9  F (36.6  C) (Oral) 5' 11.85\" (182.5 cm) 97% 24.46 kg/m2        Blood Pressure from Last 3 Encounters:   18 136/90   05/10/18 137/88   18 144/84    Weight from Last 3 Encounters:   18 179 lb 9.6 oz (81.5 kg)   05/10/18 182 lb 12.8 oz (82.9 kg)   18 180 lb 12.8 oz (82 kg)              We Performed the Following     Comprehensive Metabolic ([facility]) - Results > 1 hr     Hemoglobin A1c ([facility])     HIV Ag/Ab Screen [place] [facility]     Lipid Panel ([place])     Magnesium ([facility])     Urinalysis, Micro If ([facility])     Vitamin D 25-Hydroxy ([facility])          Today's Medication Changes          These changes are accurate as of 18  5:05 PM.  If you have any questions, ask your nurse or doctor.               Start taking these medicines.        Dose/Directions    polyethylene glycol " powder   Commonly known as:  MIRALAX   Used for:  Constipation, unspecified constipation type   Started by:  Jason Schreiber,         Dose:  1 capful   Take 17 g (1 capful) by mouth daily   Quantity:  510 g   Refills:  1            Where to get your medicines      [facility] [place] [place] - [address], [place] [address]     Phone:  [phone]     polyethylene glycol powder                Primary Care Provider Office Phone # Fax #    Guillermina Carri Mack [phone]       [facility] MEDICINE [address] [place] [address]        Equal Access to Services     [facility]: Hadii guanaco fay hadasho Soomaali, waaxda luqadaha, qaybta kaalmada adeegyada, edgar ellis . So [facility] [phone].    ATENCIÓN: Si habla español, tiene a gray disposición servicios gratuitos de asistencia lingüística. [facility] [phone].    We comply with applicable federal civil rights laws and [place] laws. We do not discriminate on the basis of race, color, national origin, age, disability, sex, sexual orientation, or gender identity.            Thank you!     Thank you for choosing [facility]  for your care. Our goal is always to provide you with excellent care. Hearing back from our patients is one way we can continue to improve our services. Please take a few minutes to complete the written survey that you may receive in the mail after your visit with us. Thank you!             Your Updated Medication List - Protect others around you: Learn how to safely use, store and throw away your medicines at www.disposemymeds.org.          This list is accurate as of 6/6/18  5:05 PM.  Always use your most recent med list.                   Brand Name Dispense Instructions for use Diagnosis    ALBUTEROL SULFATE HFA IN           BEANO TO GO Tabs     84 tablet    Take 2 tablets by mouth daily as needed     Gastroesophageal reflux disease, esophagitis presence not specified       calcium carbonate 500 MG chewable tablet    TUMS    200 tablet    Take 1 tablet (500 mg) by mouth 3 times daily as needed for heartburn    Gastroesophageal reflux disease without esophagitis       FISH OIL CONCENTRATE 300 MG Caps           fluticasone 50 MCG/ACT spray    FLONASE    1 Bottle    Spray 1 spray into both nostrils daily    Rhinosinusitis       omeprazole 20 MG CR capsule    priLOSEC    120 capsule    Take 1 capsule (20 mg) by mouth daily    Gastroesophageal reflux disease without esophagitis       polyethylene glycol powder    MIRALAX    510 g    Take 17 g (1 capful) by mouth daily    Constipation, unspecified constipation type

## 2018-06-06 NOTE — LETTER
June 19, 2018      Mahdi BRIA Cesar  [address] [address]        Dear ,  I have reviewed your lab results and do not have any concerns at this time.   -Your urine test, magnesium, hemoglobin A1c (diabetes check), and vitamin D were all normal   -Your screen for HIV was negative, which is good   -Your cholesterol is a little high.  At this point I would recommend a healthy diet and regular exercise as we discussed at your appointment to improve your cholesterol.   -Your electrolytes, kidneys, and liver tests appear to be normal.  Your bilirubin level is a little high, but this could be from your fast and is not concerning at this time. This can be rechecked in a few months.   It is important to follow the instructions we discussed in clinic.     Thank you for allowing me to participate in your care. Please follow up as we have previously discussed.   Please see below for your test results.    Resulted Orders   HIV Ag/Ab Screen [place] ([facility])   Result Value Ref Range    HIV Antigen/Antibody Negative Negative    Narrative    Test performed by:  [facility] [address] [place] [address]  Method is Abbott HIV Ag/Ab for the detection of HIV p24 antigen, HIV-1   antibodies and HIV-2 antibodies.   Hemoglobin A1c ([facility])   Result Value Ref Range    Hemoglobin A1C 5.5 4.1 - 5.7 %   Comprehensive Metabolic ([facility]) - Results > 1 hr   Result Value Ref Range    Sodium 143 136 - 145 mmol/L    Potassium 3.5 3.5 - 5.0 mmol/L    Chloride 106 98 - 107 mmol/L    CO2, Total 27 22 - 31 mmol/L    Anion Gap 10 5 - 18 mmol/L    Glucose 97 70 - 125 mg/dL    Urea Nitrogen 12 8 - 22 mg/dL    Creatinine 0.92 0.70 - 1.30 mg/dL    GFR Estimate If Black >60 >60 mL/min/1.73m2    GFR Estimate >60 >60 mL/min/1.73m2    Bilirubin Total 1.4 (H) 0.0 - 1.0 mg/dL    Calcium 9.6 8.5 - 10.5 mg/dL    Protein Total 7.5 6.0 - 8.0 g/dL    Albumin 4.1 3.5 - 5.0 g/dL    Alkaline Phosphatase 73 45 - 120 U/L     AST (SGOT) 28 0 - 40 U/L    ALT (SGPT) 27 0 - 45 U/L    Narrative    Test performed by:  [facility] [address] [place] [address]  Fasting Glucose reference range is 70-99 mg/dL per  American Diabetes Association (ADA) guidelines.   Urinalysis, Micro If ([facility])   Result Value Ref Range    Specific Gravity Urine 1.020 1.005 - 1.030    pH Urine 6.5 4.5 - 8.0    Leukocyte Esterase UR Negative NEGATIVE    Nitrite Urine Negative NEGATIVE    Protein UR Negative NEGATIVE    Glucose Urine Negative NEGATIVE    Ketones Urine Negative NEGATIVE    Urobilinogen mg/dL 0.2 E.U./dL 0.2 E.U./dL    Bilirubin UR Negative NEGATIVE    Blood UR Negative NEGATIVE   Vitamin D 25-Hydroxy ([facility])   Result Value Ref Range    Vitamin D,25-Hydroxy 35.3 30.0 - 80.0 ng/mL    Narrative    Test performed by:  [facility] [address] [place] [address]  Deficiency <10.0 ng/mL  Insufficiency 10.0-29.9 ng/mL  Sufficiency 30.0-80.0 ng/mL  Toxicity (possible) >100.0 ng/mL   Magnesium ([facility])   Result Value Ref Range    Magnesium 2.2 1.8 - 2.6 mg/dL    Narrative    Test performed by:  [facility] [address] [place] [address]   Lipid [place] (Health system) - Results > 1 hr   Result Value Ref Range    Cholesterol 244 (H) <=199 mg/dL    Triglycerides 153 (H) <=149 mg/dL    HDL Cholesterol 49 >=40 mg/dL    LDL Cholesterol Calculated 164 (H) <=129 mg/dL    Fasting? Unknown     Narrative    Test performed by:  [facility] [address] [place] [address]       If you have any questions, please call the clinic to make an appointment.    Sincerely,    Jason Schreiber, DO

## 2018-06-07 LAB — 25(OH)D3 SERPL-MCNC: 35.3 NG/ML (ref 30–80)

## 2018-06-15 ENCOUNTER — TELEPHONE (OUTPATIENT)
Dept: FAMILY MEDICINE | Facility: CLINIC | Age: 38
End: 2018-06-15

## 2018-11-02 DIAGNOSIS — K21.9 GASTROESOPHAGEAL REFLUX DISEASE WITHOUT ESOPHAGITIS: ICD-10-CM

## 2018-11-04 RX ORDER — CALCIUM CARBONATE 500 MG/1
1 TABLET, CHEWABLE ORAL 3 TIMES DAILY PRN
Qty: 200 TABLET | Refills: 0 | Status: SHIPPED | OUTPATIENT
Start: 2018-11-04

## 2018-11-06 DIAGNOSIS — K59.00 CONSTIPATION, UNSPECIFIED CONSTIPATION TYPE: ICD-10-CM

## 2018-11-07 RX ORDER — POLYETHYLENE GLYCOL 3350 17 G/17G
1 POWDER, FOR SOLUTION ORAL DAILY
Qty: 510 G | Refills: 1 | Status: SHIPPED | OUTPATIENT
Start: 2018-11-07

## 2019-01-02 ENCOUNTER — TRANSFERRED RECORDS (OUTPATIENT)
Dept: HEALTH INFORMATION MANAGEMENT | Facility: CLINIC | Age: 39
End: 2019-01-02

## 2021-02-04 ENCOUNTER — HOSPITAL ENCOUNTER (EMERGENCY)
Facility: CLINIC | Age: 41
Discharge: HOME | End: 2021-02-04
Attending: EMERGENCY MEDICINE | Admitting: EMERGENCY MEDICINE
Payer: OTHER MISCELLANEOUS

## 2021-02-04 ENCOUNTER — APPOINTMENT (OUTPATIENT)
Dept: GENERAL RADIOLOGY | Facility: CLINIC | Age: 41
End: 2021-02-04
Attending: EMERGENCY MEDICINE

## 2021-02-04 VITALS
SYSTOLIC BLOOD PRESSURE: 145 MMHG | BODY MASS INDEX: 26.15 KG/M2 | TEMPERATURE: 98 F | WEIGHT: 193.1 LBS | DIASTOLIC BLOOD PRESSURE: 108 MMHG | RESPIRATION RATE: 16 BRPM | OXYGEN SATURATION: 100 % | HEART RATE: 76 BPM | HEIGHT: 72 IN

## 2021-02-04 DIAGNOSIS — S39.012A BACK STRAIN, INITIAL ENCOUNTER: ICD-10-CM

## 2021-02-04 PROCEDURE — 99283 EMERGENCY DEPT VISIT LOW MDM: CPT | Mod: 25 | Performed by: EMERGENCY MEDICINE

## 2021-02-04 PROCEDURE — 71046 X-RAY EXAM CHEST 2 VIEWS: CPT

## 2021-02-04 PROCEDURE — 99284 EMERGENCY DEPT VISIT MOD MDM: CPT | Performed by: EMERGENCY MEDICINE

## 2021-02-04 PROCEDURE — 250N000013 HC RX MED GY IP 250 OP 250 PS 637: Performed by: EMERGENCY MEDICINE

## 2021-02-04 PROCEDURE — 71046 X-RAY EXAM CHEST 2 VIEWS: CPT | Mod: 26 | Performed by: RADIOLOGY

## 2021-02-04 RX ORDER — CYCLOBENZAPRINE HCL 10 MG
10 TABLET ORAL 3 TIMES DAILY PRN
Qty: 20 TABLET | Refills: 0 | Status: SHIPPED | OUTPATIENT
Start: 2021-02-04 | End: 2021-02-10

## 2021-02-04 RX ORDER — ACETAMINOPHEN 500 MG
1000 TABLET ORAL ONCE
Status: COMPLETED | OUTPATIENT
Start: 2021-02-04 | End: 2021-02-04

## 2021-02-04 RX ORDER — IBUPROFEN 800 MG/1
800 TABLET, FILM COATED ORAL ONCE
Status: COMPLETED | OUTPATIENT
Start: 2021-02-04 | End: 2021-02-04

## 2021-02-04 RX ORDER — CYCLOBENZAPRINE HCL 5 MG
10 TABLET ORAL ONCE
Status: COMPLETED | OUTPATIENT
Start: 2021-02-04 | End: 2021-02-04

## 2021-02-04 RX ADMIN — ACETAMINOPHEN 1000 MG: 500 TABLET ORAL at 14:50

## 2021-02-04 RX ADMIN — CYCLOBENZAPRINE HYDROCHLORIDE 10 MG: 5 TABLET, FILM COATED ORAL at 16:39

## 2021-02-04 RX ADMIN — IBUPROFEN 800 MG: 800 TABLET, FILM COATED ORAL at 14:49

## 2021-02-04 ASSESSMENT — MIFFLIN-ST. JEOR: SCORE: 1823.9

## 2021-02-04 NOTE — ED PROVIDER NOTES
ED Provider Note  [facility]      History     Chief Complaint   Patient presents with     Back Pain     HPI  Mahdi BRIA Cesar is a 40 year old male who no significant past medical history who presents with left posterior mid back pain, paraspinal along the costovertebral junction focal following a work-related injury.  He works as a , was applying torque and exertion, there was a slip and a torque wrench causing sudden twisting motion of his back resulting in the area of pain as described.    Occasionally a dissipates and then returns quite sharply and severe.  No associated lower extremity numbness or weakness no groin numbness.  No fevers or chills.  Prior to this injury had been feeling well.  Spine also no direct trauma no falls no   Impact to the painful area      Past Medical History  Past Medical History:   Diagnosis Date     Chronic sinusitis      Latent tuberculosis     completed 9 months INH     Uncomplicated asthma      Past Surgical History:   Procedure Laterality Date     SHOULDER SURGERY       TONSILLECTOMY            cyclobenzaprine (FLEXERIL) 10 MG tablet       ALBUTEROL SULFATE HFA IN       Alpha-D-Galactosidase (BEANO TO GO) TABS       calcium carbonate (TUMS) 500 MG chewable tablet       fluticasone (FLONASE) 50 MCG/ACT spray       Omega-3 Fatty Acids (FISH OIL CONCENTRATE) 300 MG CAPS       omeprazole (PRILOSEC) 20 MG CR capsule       polyethylene glycol (MIRALAX) powder      Allergies   Allergen Reactions     Pollen Extract Itching     Dogs,cats     Family History  Family History   Problem Relation Age of Onset     Dementia Mother      Cancer No family hx of      Social History   Social History     Tobacco Use     Smoking status: Former Smoker     Quit date:      Years since quittin.1     Smokeless tobacco: Never Used   Substance Use Topics     Alcohol use: No     Drug use: No      Past medical history, past surgical history, medications,  allergies, family history, and social history were reviewed with the patient. No additional pertinent items.       Review of Systems  A complete review of systems was performed with pertinent positives and negatives noted in the HPI, and all other systems negative.    Physical Exam   BP: (!) 189/120  Pulse: 94  Temp: 98  F (36.7  C)  Resp: 16  Height: 182.9 cm (6')  Weight: 87.6 kg (193 lb 1.6 oz)  SpO2: 98 %  Physical Exam  GEN: Well appearing, but in some pain, non toxic, cooperative and conversant.   HEENT: The head is normocephalic and atraumatic. Pupils are equal round and reactive to light. Extraocular motions are intact. There is no facial swelling.   CV: Regular rate   PULM: Unlabored breathing   Back: left posterior costal margin at vertebral joint focal ttp.   EXT: Full range of motion.  No edema.  NEURO: Cranial nerves II through XII are intact and symmetric. Bilateral upper and lower extremities grossly show full range of motion without any focal deficits.     PSYCH: Calm and cooperative, interactive.       ED Course      Procedures        XR chest pending      Results for orders placed or performed during the hospital encounter of 02/04/21   XR Chest 2 Views     Status: None    Narrative    EXAM: XR CHEST 2 VW  2/4/2021 2:52 PM     HISTORY:  left posterior rib sprain vs trauma, low susp for fx       COMPARISON:  None    FINDINGS: PA and lateral radiographs of the chest. The mediastinal  border, cardiac silhouette, and pulmonary vasculature are within  normal limits. No focal airspace opacities. No pneumothorax. No  pleural effusions. No displaced rib fractures.      Impression    IMPRESSION: No focal airspace disease. No displaced rib fractures or  pneumothorax.    I have personally reviewed the examination and initial interpretation  and I agree with the findings.    ASHLEE BAUTISTA MD     Medications   acetaminophen (TYLENOL) tablet 1,000 mg (1,000 mg Oral Given 2/4/21 1450)   ibuprofen (ADVIL/MOTRIN)  tablet 800 mg (800 mg Oral Given 2/4/21 1449)   cyclobenzaprine (FLEXERIL) tablet 10 mg (10 mg Oral Given 2/4/21 1639)        Assessments & Plan (with Medical Decision Making)   40-year-old male with no significant past medical history presenting with left lower paraspinous/vertebral back pain as described following a mechanical injury without direct trauma.    DDx is broad including fracture, pneumothorax, subluxation of the rib, paraspinous muscle sprain, internal injury ( less likely) among other causes is clinically well-appearing has a reassuring neurologic examination.    Chest x-ray pending     Received Tylenol and ibuprofen orally with significant improvement in his pain as well as his blood pressure.  My suspicion for fracture as well as neurologic compromise is low.  X-ray is pending.  He is signed out to the oncoming physician with plan for outpatient follow-up if the imaging is negative.         I have reviewed the nursing notes. I have reviewed the findings, diagnosis, plan and need for follow up with the patient.    Discharge Medication List as of 2/4/2021  5:15 PM      START taking these medications    Details   cyclobenzaprine (FLEXERIL) 10 MG tablet Take 1 tablet (10 mg) by mouth 3 times daily as needed for muscle spasms, Disp-20 tablet, R-0, Local Print             Final diagnoses:   Back strain, initial encounter       --  Herminio Hedrick  [facility] EMERGENCY DEPARTMENT  2/4/2021     Herminio Hedrick MD  02/06/21 0053

## 2021-02-04 NOTE — ED NOTES
I took signout on the patient from Dr. Hedrick.  This is a 40 year old male with a back injury. Patient injured his back by twisting it while using a torque wrench.  Pain is on the left side of his mid back. Patient was given ibuprofen and acetaminophen with some relief of pain.  XR shows now acute abnormalities. I discussed all results with patient. Patient was also given cyclobenzaprine in the ED. Patient was discharged with return precautions.      Darci Galvez,   02/04/21 2674

## 2021-02-04 NOTE — ED TRIAGE NOTES
Pt was at work and pulled a muscle in his back. Pt appears uncomfortable, unable to stand up straight. Pt has not taken any medication for pain.

## 2022-12-13 NOTE — LETTER
February 4, 2021      To Whom It May Concern:      Mahdi BRIA Cesar was seen in our Emergency Department today, 02/04/21.  I expect his condition to improve over the next 2 days.  He may return to work when improved.    Sincerely,        Darci Galvez, DO         PATIENT REVIEW OF SYSTEMS     General ROS:  1.   Constitutional No   2.   Eyes No   3.   Cardiac No   4.   Respiratory No   5.   Gastrointestinal No   6.   Genitourinary No   7.   Musculoskeletal No   8.   Endocrine No   9.   Hematologic/Lymphatic No   10. Integument No   11. Neurological No   12. Allergy/Immunologic No   13. Psychiatric No     RN present in exam room during patient encounter.

## 2024-08-10 NOTE — TELEPHONE ENCOUNTER
Patient notified of information below and verbalizes understanding. /Gaurav RN      Notes Recorded by Jason Schreiber DO on 6/12/2018 at 10:11 AM  Dear   Tali,     I have reviewed your lab results and do not have any concerns at this time.   -Your urine test, magnesium, hemoglobin A1c (diabetes check), and vitamin D were all normal  -Your screen for HIV was negative, which is good  -Your cholesterol is a little high.  At this point I would recommend a healthy diet and regular exercise as we discussed at your appointment to improve your cholesterol.  -Your electrolytes, kidneys, and liver tests appear to be normal.  Your bilirubin level is a little high, but this could be from your fast and is not concerning at this time. This can be rechecked in a few months.  It is important to follow the instructions we discussed in clinic.     Thank you for allowing me to participate in your care. Please follow up as we have previously discussed.    Sincerely,    Jason Schreiber DO PGY-3  Family Medicine Resident  [facility]   
[facility] Family Medicine phone call message- patient requesting results:    Test: Lab    Date of test: [id number]    Additional Comments: none     OK to leave a message on voice mail? Yes    Primary language: British      needed? No    Call taken on Valeri 15, 2018 at 12:57 PM by Shana Rene    
[employer]ANT[employer]. /Gaurav RN    
[employer]. /Gaurav RN    
DC instructions

## 2024-08-20 ENCOUNTER — ALLIED HEALTH/NURSE VISIT (OUTPATIENT)
Dept: TRANSPLANT | Facility: CLINIC | Age: 44
End: 2024-08-20
Attending: GENETIC COUNSELOR, MS
Payer: COMMERCIAL

## 2024-08-20 DIAGNOSIS — Z84.81 FAMILY HISTORY OF GENETIC DISEASE CARRIER: Primary | ICD-10-CM

## 2024-08-20 PROCEDURE — 999N000069 HC STATISTIC GENETIC COUNSELING, < 16 MIN: Performed by: GENETIC COUNSELOR, MS

## 2024-10-03 ENCOUNTER — TELEPHONE (OUTPATIENT)
Dept: LAB | Facility: CLINIC | Age: 44
End: 2024-10-03
Payer: COMMERCIAL

## 2024-10-03 NOTE — PROGRESS NOTES
Notified , patient, with the help of Marychuy ,  ID: [id number],  that we no authorization is required for his genetic testing. This means that insurance will not pre-approve testing ahead of time until after the test has been completed and insurance billed.     Explained that insurance benefits may still apply, therefore, there could be an out of pocket cost. Provided  with estimated out of pocket cost for testing. Informed patient these are estimates and not a guaranteed out of pocket cost.     expressed understanding and would like to talk to insurance before deciding if he and his wife, Delmy, should proceed with the genetic test. I provided my name and direct line for  to reach me after he talks to insurance and if he has additional questions.  had no further questions.      Chris Villegas  Genomics Billing    [facility]  Molecular Diagnostics Laboratory  [address] [address]  [email or web address]  [email or web address]  Office: [phone]  Fax:   Employed by [employer]

## 2024-10-25 ENCOUNTER — TELEPHONE (OUTPATIENT)
Dept: LAB | Facility: CLINIC | Age: 44
End: 2024-10-25
Payer: COMMERCIAL

## 2024-10-25 NOTE — PROGRESS NOTES
I called  to follow up with our previous conversation from 10/3/24 about genetic testing for him and his wife, Delmy. I had not heard back from Lisyrosalio.  declined [facility]  services.     shared that the has been sick and injured his back recently and has not had time to discuss coverage with his insurance. He shares that if insurance is not able to provide approval for testing then he would likely decline testing due to cost, but he would like to talk to insurance first.  no longer has my contact information from last time and asked that I email him my contact information as he was not available to take it down nor does he have [employer] access.     shared his email address information: [email or web address] and I confirmed his email address with him.     had no further questions and will get back to me.      Chris Villegas  Genomics Billing    [facility]  Molecular Diagnostics Laboratory  [address] [address]  [email or web address]  [email or web address]  Office: [phone]  Fax:   Employed by [employer]

## 2024-11-18 ENCOUNTER — TELEPHONE (OUTPATIENT)
Dept: LAB | Facility: CLINIC | Age: 44
End: 2024-11-18
Payer: COMMERCIAL

## 2024-11-18 NOTE — PROGRESS NOTES
I called , patient, to follow up on our conversation from 10/25/24. Patient had wanted to talk to insurance to discuss possible out of pocket cost and was going to get back to me but I have not heard back. Unfortunately, I was not able to reach  and left a voice message with my name, direct line, and reason for my call.        Chris Villegas  Genomics Billing    [facility]  Molecular Diagnostics Laboratory  [address] [address]  [email or web address]  Office: [phone]  Fax:   Employed by [employer] [facility]

## 2024-12-02 ENCOUNTER — DOCUMENTATION ONLY (OUTPATIENT)
Dept: LAB | Facility: CLINIC | Age: 44
End: 2024-12-02
Payer: COMMERCIAL

## 2024-12-02 NOTE — PROGRESS NOTES
I have not heard back from , patient, regarding if he would like to proceed with his and his wife, Delmy's genetic testing.  had wanted to discuss coverage with his insurance from the last few times we talked (10/3/24, 10/25/24, 11/18/24 (left voice message)).  had provided his email to me from our last conversation on 10/25/24, therefore, I sent a final attempt email to Lisy[employer]al[employer] to check in and follow up on how he and Delmy would like to proceed with their test.      Chris Villegas  Genomics Billing    [facility]  Molecular Diagnostics Laboratory  [address] [address]  [email or web address]  [email or web address]  Office: [phone]  Fax:   Employed by [employer]